# Patient Record
Sex: MALE | Race: BLACK OR AFRICAN AMERICAN | NOT HISPANIC OR LATINO | URBAN - METROPOLITAN AREA
[De-identification: names, ages, dates, MRNs, and addresses within clinical notes are randomized per-mention and may not be internally consistent; named-entity substitution may affect disease eponyms.]

---

## 2023-04-13 ENCOUNTER — OFFICE (OUTPATIENT)
Dept: URBAN - METROPOLITAN AREA CLINIC 67 | Facility: CLINIC | Age: 73
End: 2023-04-13

## 2023-04-13 VITALS
WEIGHT: 130 LBS | DIASTOLIC BLOOD PRESSURE: 60 MMHG | SYSTOLIC BLOOD PRESSURE: 90 MMHG | HEIGHT: 71 IN | HEART RATE: 104 BPM

## 2023-04-13 DIAGNOSIS — Z86.010 PERSONAL HISTORY OF COLONIC POLYPS: ICD-10-CM

## 2023-04-13 DIAGNOSIS — G20 PARKINSON'S DISEASE: ICD-10-CM

## 2023-04-13 DIAGNOSIS — Z87.19 PERSONAL HISTORY OF OTHER DISEASES OF THE DIGESTIVE SYSTEM: ICD-10-CM

## 2023-04-13 DIAGNOSIS — K59.09 OTHER CONSTIPATION: ICD-10-CM

## 2023-04-13 PROCEDURE — 99204 OFFICE O/P NEW MOD 45 MIN: CPT | Performed by: INTERNAL MEDICINE

## 2023-04-13 NOTE — SERVICENOTES
I will have the patient use OTC Metamucil + Miralax daily and reserve the OTC Dulcolax as needed. We also discussed that given his most recent colonoscopy and his age, he would not need another colonoscopy unless he were to have symptoms (e.g. blood in his stool).
For now, I will have them return for follow-up as needed.

## 2023-04-13 NOTE — SERVICEHPINOTES
Erlin Bravo   is seen for an initial visit today   after a recent ER visit.He was seen in the [facility] ER on 3/18/23 secondary to resolved slurred speech, leg swelling and constipation.brBlood work demonstrated a normal CBC, liver enzymes and lipase.brA non-contrasted CT of the head was without evidence of any acute intracranial abnormality. brA non-contrasted CT of the abd/pelvis was without any acute inflammatory process or evidence of SBO - it was notable for changes of constipation with moderated sized retained stool in the rectum.Radha underwent a manual rectal fecal disimpaction and was also given a mineral oil enema - he was discharged home on OTC Miralax twice daily.Today, he is accompanied by his wife who reports that the patient had a prolonged hospitalization at [facility] In 2/2022 with a bowel obstruction (unclear etiology per her report) but since his ER visit, he has been doing well and denies any abdominal pain or nausea/vomiting and his appetite has been good. He has been taking Metamucil and has also been using OTC Dulcolax as needed for his constipation. br A colonoscopy done in 2018 (Dr. eLonard) was remarkable for a small tubular adenoma in the ascending colon - an EGD done in 2016 (Dr. Taylor) was unremarkable. 
br

## 2024-05-16 ENCOUNTER — OFFICE (OUTPATIENT)
Dept: URBAN - METROPOLITAN AREA CLINIC 67 | Facility: CLINIC | Age: 74
End: 2024-05-16

## 2024-05-16 VITALS
OXYGEN SATURATION: 93 % | SYSTOLIC BLOOD PRESSURE: 110 MMHG | WEIGHT: 129 LBS | HEIGHT: 71 IN | DIASTOLIC BLOOD PRESSURE: 78 MMHG | HEART RATE: 101 BPM

## 2024-05-16 DIAGNOSIS — G20.C PARKINSONISM, UNSPECIFIED: ICD-10-CM

## 2024-05-16 DIAGNOSIS — Z87.19 PERSONAL HISTORY OF OTHER DISEASES OF THE DIGESTIVE SYSTEM: ICD-10-CM

## 2024-05-16 DIAGNOSIS — K59.09 OTHER CONSTIPATION: ICD-10-CM

## 2024-05-16 PROCEDURE — 99213 OFFICE O/P EST LOW 20 MIN: CPT | Performed by: INTERNAL MEDICINE

## 2024-05-16 NOTE — SERVICEHPINOTES
Erlin Bravo   is seen today for a follow-up visit regarding chronic constipation.     
quinton alcantara Blood work done at the time of a [facility] ER visit in 3/2023 demonstrated a normal CBC, liver enzymes and lipase.brA non-contrasted CT of the abd/pelvis was without any acute inflammatory process or evidence of SBO - it was notable for changes of constipation with moderated sized retained stool in the rectum.His most recent colonoscopy done in 12/2023 (Dr. Leonard) was unremarkable and an EGD done in 2016 (Dr. Taylor) was also unremarkable.
br Today, he is accompanied by his wife and they report that he stopped taking the fiber supplement and Miralax - he has been increasing his dietary fiber and drinking prune juice which has been keeping him regular.
br He will typically have a BM every other day but without any straining or hard/painful stools - he does report episodes of RLQ discomfort ("sharp") that occurs every few months (last episode was about 2 weeks ago) but they only last about 30 seconds before resolving. His denies any nausea/vomiting and his weight has been stable. quinton

## 2024-05-16 NOTE — SERVICENOTES
He is doing well with an increase in his dietary fiber without any straining to have a BM or passage of hard/painful stools.
They are interested in getting established with a pulmonologist and neurologist locally (rather than going back to [place]) and I will put in those referrals today.
For now, I will have him return to see me in 1 year or sooner if needed.